# Patient Record
Sex: FEMALE | Race: OTHER | Employment: FULL TIME | URBAN - METROPOLITAN AREA
[De-identification: names, ages, dates, MRNs, and addresses within clinical notes are randomized per-mention and may not be internally consistent; named-entity substitution may affect disease eponyms.]

---

## 2024-02-09 ENCOUNTER — HOSPITAL ENCOUNTER (EMERGENCY)
Age: 49
Discharge: HOME | End: 2024-02-09

## 2024-02-09 VITALS
HEIGHT: 60 IN | OXYGEN SATURATION: 97 % | SYSTOLIC BLOOD PRESSURE: 150 MMHG | HEART RATE: 92 BPM | RESPIRATION RATE: 16 BRPM | TEMPERATURE: 99 F | BODY MASS INDEX: 31.41 KG/M2 | DIASTOLIC BLOOD PRESSURE: 81 MMHG | WEIGHT: 160 LBS

## 2024-02-09 DIAGNOSIS — S46.811A STRAIN OF RIGHT TRAPEZIUS MUSCLE, INITIAL ENCOUNTER: Primary | ICD-10-CM

## 2024-02-09 PROCEDURE — 96372 THER/PROPH/DIAG INJ SC/IM: CPT

## 2024-02-09 PROCEDURE — 99283 EMERGENCY DEPT VISIT LOW MDM: CPT

## 2024-02-09 RX ORDER — KETOROLAC TROMETHAMINE 30 MG/ML
60 INJECTION, SOLUTION INTRAMUSCULAR; INTRAVENOUS ONCE
Status: COMPLETED | OUTPATIENT
Start: 2024-02-09 | End: 2024-02-09

## 2024-02-09 RX ORDER — BACLOFEN 10 MG/1
10 TABLET ORAL 3 TIMES DAILY
Qty: 30 TABLET | Refills: 0 | Status: SHIPPED | OUTPATIENT
Start: 2024-02-09 | End: 2024-02-19

## 2024-02-09 NOTE — DISCHARGE INSTRUCTIONS
Rest and drink plenty of fluids.   Apply ice 20 minutes on and then 40 minutes off several times a day.  Take Tylenol and/or ibuprofen as needed for pain.   Use the muscle relaxer for muscle stiffness or soreness.   After the acute pain improves, start with light stretching or yoga to help prevent further injury.   Follow up with occupational medicine in 3 days     Thank you for choosing [facility] for your care.

## 2024-02-09 NOTE — ED PROVIDER NOTES
Patient Seen in: [facility] In [place]      History     Chief Complaint   Patient presents with    Neck Pain     Stated Complaint: right shoulder/neck pain    Subjective:   49 yo female presents to the emergency department with c/o right sided neck pain.  Patient states she was at work earlier this afternoon and was taking out the garbage.  She swung the garbage bag to throw it in the dumpster, and felt a pain to her right neck.  She states the pain has continued since this happened.  She has not taken anything for pain prior to arrival.  She denies any numbness or tingling.     The history is provided by the patient.         Objective:   No pertinent past medical history.            History reviewed. No pertinent surgical history.             No pertinent social history.            Review of Systems   Constitutional: Negative.    Musculoskeletal:  Positive for myalgias and neck pain.   All other systems reviewed and are negative.      Positive for stated complaint: right shoulder/neck pain  Other systems are as noted in HPI.  Constitutional and vital signs reviewed.      All other systems reviewed and negative except as noted above.    Physical Exam     ED Triage Vitals [02/09/24 1528]   /81   Pulse 92   Resp 16   Temp 98.5 °F (36.9 °C)   Temp src Temporal   SpO2 97 %   O2 Device None (Room air)       Current:/81   Pulse 92   Temp 98.5 °F (36.9 °C) (Temporal)   Resp 16   Ht 152.4 cm (5')   Wt 72.6 kg   SpO2 97%   BMI 31.25 kg/m²         Physical Exam  Vitals and nursing note reviewed.   Constitutional:       General: She is not in acute distress.     Appearance: Normal appearance. She is normal weight. She is not ill-appearing.   HENT:      Head: Normocephalic and atraumatic.      Mouth/Throat:      Mouth: Mucous membranes are moist.      Pharynx: Oropharynx is clear.   Eyes:      Conjunctiva/sclera: Conjunctivae normal.      Pupils: Pupils are equal, round, and reactive to  light.   Neck:      Comments: Tenderness with palpation of the right trapezius muscle.  There is mild spasm present.  No vertebral point tenderness, crepitus, or step-offs.  No pain with palpation of the glenohumeral or AC joints.  ROM of the shoulder and neck is normal.  Motor strength and sensation intact.  Radial pulses 2+.  Cardiovascular:      Rate and Rhythm: Normal rate and regular rhythm.      Pulses: Normal pulses.      Heart sounds: Normal heart sounds.   Pulmonary:      Effort: Pulmonary effort is normal. No respiratory distress.      Breath sounds: Normal breath sounds.   Musculoskeletal:         General: Normal range of motion.      Cervical back: Normal range of motion and neck supple. Muscular tenderness present. No spinous process tenderness.   Skin:     General: Skin is warm and dry.      Capillary Refill: Capillary refill takes less than 2 seconds.   Neurological:      General: No focal deficit present.      Mental Status: She is alert and oriented to person, place, and time.   Psychiatric:         Mood and Affect: Mood normal.         Behavior: Behavior normal.           ED Course   Labs Reviewed - No data to display                 MDM                               Medical Decision Making  48-year-old female with history and exam consistent with a muscular strain of the right trapezius muscle.  Toradol ordered for pain.  Do not feel that any imaging is necessary at this time we will plan to manage medically with over-the-counter ibuprofen/Tylenol and a mild muscle relaxer. Pt. To follow up with occupational medicine in a few days.       Patient had some relief with Toradol.  Baclofen prescribed for home.  Note given for work.     Risk  Prescription drug management.        Disposition and Plan     Clinical Impression:  1. Strain of right trapezius muscle, initial encounter         Disposition:  Discharge  2/9/2024  4:42 pm    Follow-up:  [facility]  [address] [place] [address] [address]  [address]  [facility] [address]  [phone]  Call in 3 days            Medications Prescribed:  Current Discharge Medication List        START taking these medications    Details   baclofen 10 MG Oral Tab Take 1 tablet (10 mg total) by mouth 3 (three) times daily for 10 days.  Qty: 30 tablet, Refills: 0

## 2024-02-14 ENCOUNTER — OFFICE VISIT (OUTPATIENT)
Dept: OCCUPATIONAL MEDICINE | Age: 49
End: 2024-02-14
Attending: NURSE PRACTITIONER

## 2024-02-20 ENCOUNTER — OFFICE VISIT (OUTPATIENT)
Dept: OCCUPATIONAL MEDICINE | Age: 49
End: 2024-02-20
Attending: PHYSICIAN ASSISTANT

## 2024-02-20 DIAGNOSIS — S46.911D RIGHT SHOULDER STRAIN, SUBSEQUENT ENCOUNTER: Primary | ICD-10-CM

## 2024-02-20 DIAGNOSIS — S16.1XXD CERVICAL STRAIN, ACUTE, SUBSEQUENT ENCOUNTER: ICD-10-CM

## 2024-02-27 ENCOUNTER — OFFICE VISIT (OUTPATIENT)
Dept: OCCUPATIONAL MEDICINE | Age: 49
End: 2024-02-27
Attending: PHYSICIAN ASSISTANT

## 2024-02-27 DIAGNOSIS — S49.91XD RIGHT SHOULDER INJURY, SUBSEQUENT ENCOUNTER: Primary | ICD-10-CM

## 2024-03-15 ENCOUNTER — HOSPITAL ENCOUNTER (OUTPATIENT)
Dept: MRI IMAGING | Age: 49
Discharge: HOME | End: 2024-03-15
Attending: PHYSICIAN ASSISTANT
Payer: OTHER MISCELLANEOUS

## 2024-03-15 DIAGNOSIS — S49.91XD RIGHT SHOULDER INJURY, SUBSEQUENT ENCOUNTER: ICD-10-CM

## 2024-03-15 PROCEDURE — 73221 MRI JOINT UPR EXTREM W/O DYE: CPT | Performed by: PHYSICIAN ASSISTANT

## 2024-03-16 NOTE — PROGRESS NOTES
Called patient to discuss results. Patient requesting f/u appt 03/18 or 03/19 anytime after 3pm. Scheduled f/u appt for 03/19/24 @ 5pm.

## 2024-03-19 ENCOUNTER — OFFICE VISIT (OUTPATIENT)
Dept: OCCUPATIONAL MEDICINE | Age: 49
End: 2024-03-19
Attending: PHYSICIAN ASSISTANT

## 2024-03-28 ENCOUNTER — TELEPHONE (OUTPATIENT)
Dept: ORTHOPEDICS CLINIC | Facility: CLINIC | Age: 49
End: 2024-03-28

## 2024-03-28 NOTE — TELEPHONE ENCOUNTER
Patient coming in for Right Shoulder pain.    Worker's Comp injury.    Imaging in EPIC from Immediate [facility].    Please advise if anything additional is needed.    Future Appointments   Date Time Provider Department Center   4/1/2024  9:00 AM [facility]ering [id number]

## 2024-04-08 ENCOUNTER — OFFICE VISIT (OUTPATIENT)
Dept: ORTHOPEDICS CLINIC | Facility: CLINIC | Age: 49
End: 2024-04-08

## 2024-04-08 VITALS — WEIGHT: 160 LBS | HEIGHT: 61 IN | BODY MASS INDEX: 30.21 KG/M2

## 2024-04-08 DIAGNOSIS — M75.41 ROTATOR CUFF IMPINGEMENT SYNDROME OF RIGHT SHOULDER: ICD-10-CM

## 2024-04-08 DIAGNOSIS — M75.01 ADHESIVE CAPSULITIS OF RIGHT SHOULDER: Primary | ICD-10-CM

## 2024-04-08 RX ORDER — MELOXICAM 15 MG/1
15 TABLET ORAL DAILY
Qty: 30 TABLET | Refills: 0 | Status: SHIPPED | OUTPATIENT
Start: 2024-04-08 | End: 2024-05-08

## 2024-04-08 NOTE — H&P
[facility] - ORTHOPEDICS  [address] [place] [address]  [phone]     NEW PATIENT VISIT - HISTORY AND PHYSICAL EXAMINATION     Name: Elizabeth Vera   MRN: [id number]  Date: 4/8/2024     CC: Right shoulder pain.    REFERRED BY: No primary care provider on file.    HPI:   Elizabeth Vera is a very pleasant 48 year old right-hand dominant female who presents today for evaluation, consultation, and management of with right shoulder pain following a work injury on 02/09/2024- not treatment to date. She works for [employer], and works as a . Pain is a 5/10, and recently had an MRI completed.     She is working full duty.     PMH:   No past medical history on file.    PAST SURGICAL HX:  No past surgical history on file.    FAMILY HX:  No family history on file.    ALLERGIES:  Patient has no known allergies.    MEDICATIONS:   No current outpatient medications on file.       ROS: A comprehensive 14 point review of systems was performed and was negative aside from the aforementioned per history of present illness.    SOCIAL HX:  Social History     Occupational History    Not on file   Tobacco Use    Smoking status: Never     Passive exposure: Never    Smokeless tobacco: Never   Substance and Sexual Activity    Alcohol use: Not on file    Drug use: Not on file    Sexual activity: Not on file        PE:   Vitals:    04/08/24 0906   Weight: 160 lb (72.6 kg)   Height: 5' 1\" (1.549 m)     Estimated body mass index is 30.23 kg/m² as calculated from the following:    Height as of this encounter: 5' 1\" (1.549 m).    Weight as of this encounter: 160 lb (72.6 kg).    Physical Exam  Constitutional:       Appearance: Normal appearance.   HENT:      Head: Normocephalic and atraumatic.   Eyes:      Extraocular Movements: Extraocular movements intact.   Neck:      Musculoskeletal: Normal range of motion and neck supple.   Cardiovascular:      Pulses: Normal pulses.   Pulmonary:       Effort: Pulmonary effort is normal. No respiratory distress.   Abdominal:      General: There is no distension.   Skin:     General: Skin is warm.      Capillary Refill: Capillary refill takes less than 2 seconds.      Findings: No bruising.   Neurological:      General: No focal deficit present.      Mental Status: Alert.   Psychiatric:         Mood and Affect: Mood normal.     Examination of the right shoulder demonstrates:     Skin is intact, warm and dry.   Cervical:  Full ROM  Spurling's  Negative    Deformity:   none  Atrophy:   none    Scapular winging: Negative    Palpation:     AC Joint   Negative  Biceps Tendon  Positive  Greater Tuberosity Positive    ROM:   Forward Flexion:  120°, active guarded to 150   Abduction:   full and symmetric  External Rotation:  full and symmetric  Internal Rotation:  full and symmetric    Rotator Cuff Strength:   Supraspinatus:   5/5  Subscapularis:   5/5  Infraspinatus/Teres: 5/5    Provocative Tests:   Emery:   Positive  Speed's:   Positive  [place]'s:   Negative  Lift-off:   Negative  Apprehension:  Negative  Sulcus Sign:   Negative    Neurovascular Upper Extremity (Bilateral)  Motor:    5/5 EPL, Finger Abduction, , Pinch, Deltoid  Sensation:   intact to light touch median, ulnar, radial and axillary nerve  Circulation:   Normal, 2+ radial pulse    The contralateral upper extremity is without limitation in range of motion or strength, no positive provocative maneuvers.     Radiographic Examination/Diagnostics:    I personally viewed, independently interpreted and radiology report was reviewed.      MRI SHOULDER, RIGHT (CPT=73221)    Result Date: 3/15/2024  PROCEDURE:  MRI SHOULDER, RIGHT (CPT=73221)  COMPARISON:  None.  INDICATIONS:  S49.91XD Right shoulder injury, subsequent encounter  TECHNIQUE:  Multiplanar imaging of the shoulder including oblique coronal, axial and sagittal imaging was acquired including proton density fat suppression technique. Images were  performed without intravenous gadolinium.  PATIENT STATED HISTORY: (As transcribed by Technologist)  Patient complains of right lateral shoulder pain for one month.    FINDINGS:  ROTATOR CUFF:  Mild insertional tendinosis and bursal surface fraying of the distal supraspinatus and infraspinatus tendons without discrete tendon tear.  The subscapularis and teres minor constituents of the rotator cuff are within normal limits. MUSCULATURE:  No strain, edema, or atrophy.  AC JOINT/ACROMION:  Type 1 acromion.  Normally aligned acromioclavicular joint without significant capsular thickening or periarticular edema.  No subacromial spurring or lateral acromial downsloping.  Trace subacromial/subdeltoid bursal fluid consistent  with mild bursitis. BICEPS/LABRAL COMPLEX:  The long head of the biceps tendon is normally situated within the intertubercular sulcus, maintaining normal signal and morphology.  No evidence of labral tear or detachment. GLENOHUMERAL JOINT:  No significant arthritis or acute injury.  Normal marrow and cortical signal.  No joint effusion.  Mild thickening and intermediate signal of the coracohumeral ligament and anterior band of the inferior glenohumeral ligament with mild edema of the rotator interval, features which may be seen with adhesive capsulitis.            CONCLUSION:   1. Mild insertional tendinosis and bursal surface fraying of the distal supraspinatus and infraspinatus tendons without discrete tendon tear.  2. No gross labral or biceps pathology.  3. Imaging features which may be seen with adhesive capsulitis, as above.   4. Trace subacromial/subdeltoid bursal fluid consistent with mild bursitis.    LOCATION:  [facility]   Dictated by (CST): Esther Montesinos MD on 3/15/2024 at 4:19 PM     Finalized by (CST): Esther Montesinos MD on 3/15/2024 at 4:23 PM         IMPRESSION: Elizabeth Vera is a 48 year old Right hand dominant female with right shoulder adhesive capsulitis and impingement.     PLAN:   We  had a detailed discussion outlining the etiology, anatomy, pathophysiology, and natural history of the patient's findings. Imaging was reviewed in detail and correlated to a 3-dimensional model of the patient's pathology.     We reviewed the treatment of this disease condition.  Fortunately, treatment is amenable to conservative treatment which we chose to optimize at today's visit.  After a discussion of a variety of conservative treatment options we elected to proceed with the injection procedure at today's visit. We discussed the risk and benefits of the procedure, including, but not limited to: infection, injury to blood vessels, nerve injury, prolonged pain, swelling, site soreness, failure to progress, and need for advanced treatments.  The patient voiced understanding and agreed to proceed with the treatment plan.     She refused the injection today, Meloxicam 15mg. Return to work no lifting, pulling or pushing greater than 5lbs with right upper extremity.     We recommended physical therapy to aid in strengthening, range of motion, functional improvement, and return to baseline activity.  The patient had the opportunity to ask questions and all questions were answered appropriately.      FOLLOW-UP:   Return to clinic in four weeks. No imaging required at next visit.           Marsha Pickering [facility], PA-C Orthopedic Surgery / Sports Medicine Specialist  [facility]  [address] [place] [address] [id number] [place], [place] [email or web address]  [email or web address]  t: [phone]  o: [phone]  f: [phone]    This note was dictated using Dragon software.  While it was briefly proofread prior to completion, some grammatical, spelling, and word choice errors due to dictation may still occur.

## 2024-07-01 ENCOUNTER — OFFICE VISIT (OUTPATIENT)
Dept: ORTHOPEDICS CLINIC | Facility: CLINIC | Age: 49
End: 2024-07-01

## 2024-07-01 DIAGNOSIS — M75.01 ADHESIVE CAPSULITIS OF RIGHT SHOULDER: Primary | ICD-10-CM

## 2024-07-01 DIAGNOSIS — M75.41 ROTATOR CUFF IMPINGEMENT SYNDROME OF RIGHT SHOULDER: ICD-10-CM

## 2024-07-01 PROCEDURE — 99213 OFFICE O/P EST LOW 20 MIN: CPT | Performed by: PHYSICIAN ASSISTANT

## 2024-07-01 NOTE — PROGRESS NOTES
[facility] - ORTHOPEDICS  [address] [place] [address]  [phone]       Name: Elizabeth Vera   MRN: [id number]  Date: 7/1/2024     REASON FOR VISIT: Follow up for right shoulder pain.    INTERVAL HISTORY:  Elizabeth Vera is a 49 year old female who returns for evaluation of right shoulder pain.  At her last visit she demonstrated findings of adhesive capsulitis.  She was provided with meloxicam and physical therapy.  She has noted improvement and has finished physical therapy.    ROS: ROS    PE:   There were no vitals filed for this visit.  Estimated body mass index is 30.23 kg/m² as calculated from the following:    Height as of 4/8/24: 5' 1\" (1.549 m).    Weight as of 4/8/24: 160 lb (72.6 kg).    Physical Exam  Constitutional:       Appearance: Normal appearance.   HENT:      Head: Normocephalic and atraumatic.   Eyes:      Extraocular Movements: Extraocular movements intact.   Neck:      Musculoskeletal: Normal range of motion and neck supple.   Cardiovascular:      Pulses: Normal pulses.   Pulmonary:      Effort: Pulmonary effort is normal. No respiratory distress.   Abdominal:      General: There is no distension.   Skin:     General: Skin is warm.      Capillary Refill: Capillary refill takes less than 2 seconds.      Findings: No bruising.   Neurological:      General: No focal deficit present.      Mental Status: She is alert.   Psychiatric:         Mood and Affect: Mood normal.       Examination of the right shoulder demonstrates:     Skin is intact, warm and dry.   Cervical:  Full ROM  Spurling's  Negative    Deformity:   none  Atrophy:   none    Scapular winging: Negative    Palpation:     AC Joint  Negative  Biceps Tendon  Negative  Greater Tuberosity Negative    ROM:   Forward Flexion:  full and symmetric  Abduction:   full and symmetric  External Rotation:  full and symmetric  Internal Rotation:  full and symmetric    Rotator Cuff Strength:   Supraspinatus:    5/5  Subscapularis:   5/5  Infraspinatus/Teres: 5/5    Provocative Tests:   Emery:   Negative  Speed's:   Negative  [place]'s:   Negative  Lift-off:   Negative  Apprehension:  Negative  Sulcus Sign:   Negative    Neurovascular Upper Extremity (Bilateral)  Motor:    5/5 EPL, Finger Abduction, , Pinch, Deltoid  Sensation:   intact to light touch median, ulnar, radial and axillary nerve  Circulation:   Normal, 2+ radial pulse    The contralateral upper extremity is without limitation in range of motion or strength, no positive provocative maneuvers.        Radiographic Examination/Diagnostics:    I personally viewed, independently interpreted and radiology report was reviewed.    No results found.    IMPRESSION: Elizabeth Vera is a 49 year old female who presented for follow up of RIGHT adhesive capsulitis, resolved.     PLAN:   We had a detailed discussion outlining the etiology, anatomy, pathophysiology, and natural history of the patient's findings.    We reviewed the treatment of this disease condition.      The patient notes near complete resolution of symptoms, and return to full baseline function. The patient can follow up with our office as needed. The patient had the opportunity to ask questions, and all questions were answered appropriately.     FOLLOW-UP:  Return to clinic on an as needed basis.             Marsha Pickering, [facility], PA-C Orthopedic Surgery / Sports Medicine Specialist  [facility]  [address] [place] [email or web address]  [email or web address]  t: [phone]  o: [phone]  f: [phone]    This note was dictated using Dragon software.  While it was briefly proofread prior to completion, some grammatical, spelling, and word choice errors due to dictation may still occur.

## (undated) NOTE — LETTER
Date: 7/1/2024    Patient Name: Elizabeth Vera          To Whom it may concern:    This letter has been written at the patient's request. The above patient was seen at [facility] for treatment of a medical condition.    The patient can return without restrictions, follow up as needed.       Sincerely,          Sincer STEPH Malloy, PA-C Orthopedic Surgery / Sports Medicine Specialist  [facility] Orthopaedic Surgery  [address] [place] [address].[facility]  [email or web address]  t: [phone]  o: [phone]  f: [phone]    This note was dictated using Dragon software.  While it was briefly proofread prior to completion, some grammatical, spelling, and word choice errors due to dictation may still occur.

## (undated) NOTE — LETTER
Date & Time: 2/9/2024, 4:42 PM  Patient: Arely Ledesma  Encounter Provider(s):    Pat Argueta APRN       To Whom It May Concern:    Arely Ledesma was seen and treated in our department on 2/9/2024. She should not return to work until cleared by occupational medicine .    If you have any questions or concerns, please do not hesitate to call.        _____________________________  Physician/APC Signature

## (undated) NOTE — LETTER
Date: 4/8/2024    Patient Name: Elizabeth Vera          To Whom it may concern:    This letter has been written at the patient's request. The above patient was seen at [facility] for treatment of a medical condition.    Return to work no lifting, pulling or pushing greater than 5lbs with right upper extremity. Follow up in four weeks.         Sincerely,          Sincer ROD Pickering [facility], PA-C Orthopedic Surgery / Sports Medicine Specialist  [facility]  [address] [place] [address].[email or web address]  t: [phone]  o: [phone]  f: [phone]    This note was dictated using Dragon software.  While it was briefly proofread prior to completion, some grammatical, spelling, and word choice errors due to dictation may still occur.